# Patient Record
Sex: MALE | Race: WHITE | NOT HISPANIC OR LATINO | Employment: FULL TIME | URBAN - NONMETROPOLITAN AREA
[De-identification: names, ages, dates, MRNs, and addresses within clinical notes are randomized per-mention and may not be internally consistent; named-entity substitution may affect disease eponyms.]

---

## 2023-11-29 ENCOUNTER — APPOINTMENT (OUTPATIENT)
Dept: CHIROPRACTIC MEDICINE | Facility: OTHER | Age: 33
End: 2023-11-29

## 2023-11-29 ENCOUNTER — APPOINTMENT (OUTPATIENT)
Dept: OCCUPATIONAL MEDICINE | Facility: OTHER | Age: 33
End: 2023-11-29

## 2023-11-29 PROCEDURE — 99499 UNLISTED E&M SERVICE: CPT

## 2023-11-29 PROCEDURE — 99000 SPECIMEN HANDLING OFFICE-LAB: CPT

## 2024-08-05 NOTE — PROGRESS NOTES
"  Assessment & Plan     (K21.9) Gastroesophageal reflux disease without esophagitis  (primary encounter diagnosis)  Comment: Stable. RF   Plan: omeprazole (PRILOSEC) 20 MG DR capsule            (G47.00) Persistent insomnia  Comment: RF. He takes Trazodone 150 mg at bedtime   Plan: traZODone (DESYREL) 50 MG tablet            (F41.1) BENITO (generalized anxiety disorder)  Comment: RF Lexapro. Add Wellbutrin   Plan: buPROPion (WELLBUTRIN XL) 150 MG 24 hr tablet,         escitalopram (LEXAPRO) 20 MG tablet            (F34.1) Dysthymia  Comment: RF Lexapro. Add Wellbutrin   Plan: buPROPion (WELLBUTRIN XL) 150 MG 24 hr tablet,         escitalopram (LEXAPRO) 20 MG tablet            (Z76.89) Encounter to establish care  Comment: Reviewed past medical, surgical, and family history. Medications reviewed. Care established   Plan: As above     The longitudinal plan of care for the diagnosis(es)/condition(s) as documented were addressed during this visit. Due to the added complexity in care, I will continue to support Chaitanya in the subsequent management and with ongoing continuity of care.    BMI  Estimated body mass index is 35.12 kg/m  as calculated from the following:    Height as of this encounter: 1.93 m (6' 4\").    Weight as of this encounter: 130.9 kg (288 lb 8 oz).   Weight management plan: Discussed healthy diet and exercise guidelines      See Patient Instructions    Return in about 6 weeks (around 9/24/2024) for Med check follow up and fasting physical .    Kameron Tavares is a 33 year old, presenting for the following health issues:  Establish Care        8/13/2024     8:34 AM   Additional Questions   Roomed by Lanie KELLY     History of Present Illness       Reason for visit:  Recent move    He eats 0-1 servings of fruits and vegetables daily.He consumes 1 sweetened beverage(s) daily.He exercises with enough effort to increase his heart rate 10 to 19 minutes per day.  He exercises with enough effort to increase his " heart rate 5 days per week.   He is taking medications regularly.       New Patient/Transfer of Care     Chaitanya moved to [place] from [place] in 2023. Last PCP at [facility] in [place]. He signed EDUARDA for records.     He works overnights as a . He has increased stress with working overnights. He is trying to find a job that doesn't require overnight work.     Health Care Directive  Patient does not have a Health Care Directive or Living Will: Discussed advance care planning with patient; however, patient declined at this time.    Medication Followup of Lexapro  Taking Medication as prescribed: yes  Side Effects:  sex drive is way down since starting it  Medication Helping Symptoms:  it does help, but doesn't fee like it is as effective as a year ago    Depression and Anxiety   How are you doing with your depression since your last visit? Worsened   How are you doing with your anxiety since your last visit?  No change  Are you having other symptoms that might be associated with depression or anxiety? No  Have you had a significant life event? OTHER: moved and switched jobs    Do you have any concerns with your use of alcohol or other drugs? No  Denies SI/HI thoughts.     Social History     Tobacco Use    Smoking status: Former     Current packs/day: 0.00     Average packs/day: 1 pack/day for 10.0 years (10.0 ttl pk-yrs)     Types: Cigarettes     Start date: 10/28/2009     Quit date: 10/28/2019     Years since quittin.7    Smokeless tobacco: Never   Vaping Use    Vaping status: Never Used   Substance Use Topics    Alcohol use: Yes     Comment: Social drinker. A few a week    Drug use: Never         2024     8:44 AM   PHQ   PHQ-9 Total Score 4   Q9: Thoughts of better off dead/self-harm past 2 weeks Not at all         2024     8:44 AM   BENITO-7 SCORE   Total Score 5     Denies SI/HI thoughts.     Review of Systems  Constitutional, HEENT, cardiovascular, pulmonary, GI, , musculoskeletal,  "neuro, skin, endocrine and psych systems are negative, except as otherwise noted.      Objective    /80 (BP Location: Left arm, Patient Position: Sitting, Cuff Size: Adult Large)   Pulse 70   Temp 97.1  F (36.2  C) (Tympanic)   Resp 20   Ht 1.93 m (6' 4\")   Wt 130.9 kg (288 lb 8 oz)   SpO2 96%   BMI 35.12 kg/m    Body mass index is 35.12 kg/m .  Physical Exam   GENERAL: alert and no distress  NECK: no adenopathy, no asymmetry, masses, or scars  RESP: lungs clear to auscultation - no rales, rhonchi or wheezes  CV: regular rate and rhythm, normal S1 S2, no S3 or S4, no murmur, click or rub, no peripheral edema  MS: no gross musculoskeletal defects noted, no edema  SKIN: no suspicious lesions or rashes  NEURO: Normal strength and tone, mentation intact and speech normal  PSYCH: mentation appears normal, affect normal/bright        Signed Electronically by: CAROL Burdick CNP    "

## 2024-08-13 ENCOUNTER — OFFICE VISIT (OUTPATIENT)
Dept: FAMILY MEDICINE | Facility: OTHER | Age: 34
End: 2024-08-13
Attending: NURSE PRACTITIONER
Payer: COMMERCIAL

## 2024-08-13 VITALS
RESPIRATION RATE: 20 BRPM | DIASTOLIC BLOOD PRESSURE: 80 MMHG | SYSTOLIC BLOOD PRESSURE: 124 MMHG | HEIGHT: 76 IN | TEMPERATURE: 97.1 F | WEIGHT: 288.5 LBS | OXYGEN SATURATION: 96 % | HEART RATE: 70 BPM | BODY MASS INDEX: 35.13 KG/M2

## 2024-08-13 DIAGNOSIS — Z76.89 ENCOUNTER TO ESTABLISH CARE: ICD-10-CM

## 2024-08-13 DIAGNOSIS — F34.1 DYSTHYMIA: ICD-10-CM

## 2024-08-13 DIAGNOSIS — G47.00 PERSISTENT INSOMNIA: ICD-10-CM

## 2024-08-13 DIAGNOSIS — K21.9 GASTROESOPHAGEAL REFLUX DISEASE WITHOUT ESOPHAGITIS: Primary | ICD-10-CM

## 2024-08-13 DIAGNOSIS — F41.1 GAD (GENERALIZED ANXIETY DISORDER): ICD-10-CM

## 2024-08-13 PROCEDURE — 99204 OFFICE O/P NEW MOD 45 MIN: CPT | Performed by: NURSE PRACTITIONER

## 2024-08-13 PROCEDURE — G2211 COMPLEX E/M VISIT ADD ON: HCPCS | Performed by: NURSE PRACTITIONER

## 2024-08-13 RX ORDER — OMEPRAZOLE 20 MG/1
20 TABLET, DELAYED RELEASE ORAL DAILY
COMMUNITY
End: 2024-08-13

## 2024-08-13 RX ORDER — ESCITALOPRAM OXALATE 20 MG/1
20 TABLET ORAL DAILY
Qty: 90 TABLET | Refills: 3 | Status: SHIPPED | OUTPATIENT
Start: 2024-08-13 | End: 2024-11-11

## 2024-08-13 RX ORDER — BUPROPION HYDROCHLORIDE 150 MG/1
150 TABLET ORAL EVERY MORNING
Qty: 90 TABLET | Refills: 0 | Status: SHIPPED | OUTPATIENT
Start: 2024-08-13

## 2024-08-13 RX ORDER — TRAZODONE HYDROCHLORIDE 50 MG/1
150 TABLET, FILM COATED ORAL AT BEDTIME
Qty: 270 TABLET | Refills: 3 | Status: SHIPPED | OUTPATIENT
Start: 2024-08-13 | End: 2024-11-11

## 2024-08-13 ASSESSMENT — ANXIETY QUESTIONNAIRES
GAD7 TOTAL SCORE: 5
GAD7 TOTAL SCORE: 5
5. BEING SO RESTLESS THAT IT IS HARD TO SIT STILL: NOT AT ALL
2. NOT BEING ABLE TO STOP OR CONTROL WORRYING: SEVERAL DAYS
3. WORRYING TOO MUCH ABOUT DIFFERENT THINGS: SEVERAL DAYS
7. FEELING AFRAID AS IF SOMETHING AWFUL MIGHT HAPPEN: NOT AT ALL
IF YOU CHECKED OFF ANY PROBLEMS ON THIS QUESTIONNAIRE, HOW DIFFICULT HAVE THESE PROBLEMS MADE IT FOR YOU TO DO YOUR WORK, TAKE CARE OF THINGS AT HOME, OR GET ALONG WITH OTHER PEOPLE: SOMEWHAT DIFFICULT
1. FEELING NERVOUS, ANXIOUS, OR ON EDGE: SEVERAL DAYS
6. BECOMING EASILY ANNOYED OR IRRITABLE: SEVERAL DAYS

## 2024-08-13 ASSESSMENT — PATIENT HEALTH QUESTIONNAIRE - PHQ9
SUM OF ALL RESPONSES TO PHQ QUESTIONS 1-9: 4
5. POOR APPETITE OR OVEREATING: SEVERAL DAYS

## 2024-08-13 ASSESSMENT — PAIN SCALES - GENERAL: PAINLEVEL: NO PAIN (0)

## 2024-10-06 ENCOUNTER — HEALTH MAINTENANCE LETTER (OUTPATIENT)
Age: 34
End: 2024-10-06

## 2024-10-20 DIAGNOSIS — F34.1 DYSTHYMIA: ICD-10-CM

## 2024-10-20 DIAGNOSIS — F41.1 GAD (GENERALIZED ANXIETY DISORDER): ICD-10-CM

## 2024-10-21 RX ORDER — BUPROPION HYDROCHLORIDE 150 MG/1
150 TABLET ORAL EVERY MORNING
Qty: 90 TABLET | Refills: 0 | Status: SHIPPED | OUTPATIENT
Start: 2024-10-21

## 2025-02-03 DIAGNOSIS — F41.1 GAD (GENERALIZED ANXIETY DISORDER): ICD-10-CM

## 2025-02-03 DIAGNOSIS — F34.1 DYSTHYMIA: ICD-10-CM

## 2025-02-03 RX ORDER — BUPROPION HYDROCHLORIDE 150 MG/1
150 TABLET ORAL EVERY MORNING
Qty: 90 TABLET | Refills: 1 | Status: SHIPPED | OUTPATIENT
Start: 2025-02-03

## 2025-07-18 DIAGNOSIS — F41.1 GAD (GENERALIZED ANXIETY DISORDER): ICD-10-CM

## 2025-07-18 DIAGNOSIS — F34.1 DYSTHYMIA: ICD-10-CM

## 2025-07-18 DIAGNOSIS — K21.9 GASTROESOPHAGEAL REFLUX DISEASE WITHOUT ESOPHAGITIS: ICD-10-CM

## 2025-07-18 NOTE — TELEPHONE ENCOUNTER
escitalopram (LEXAPRO) 20 MG tablet       Last Written Prescription Date:  8/13/24  Last Fill Quantity: 90,   # refills: 3  Last Office Visit: 8/13/24  Future Office visit:       Routing refill request to provider for review/approval because:  SSRIs Protocol Failed      PHQ-9 score less than 5 in past 6 months    Please review last PHQ-9 score.        8/13/2024     8:44 AM   PHQ-9 SCORE   PHQ-9 Total Score 4        BENITO-7 score of less than 5 in past 6 months.    Please review last BENITO-7 score.        8/13/2024     8:44 AM   BENITO-7 SCORE   Total Score 5

## 2025-07-18 NOTE — TELEPHONE ENCOUNTER
Omeprazole 20mg Delayed-Release Capsule         Last Written Prescription Date:    Last Fill Quantity: ,   # refills:   Last Office Visit: 8/13/24  Future Office visit:       Routing refill request to provider for review/approval because:  PPI Protocol [id number]/2025 12:17 PM   Protocol Details Medication is active on med list and the sig matches. RN to manually verify dose and sig if red X/fail.

## 2025-07-21 RX ORDER — OMEPRAZOLE 20 MG/1
20 CAPSULE, DELAYED RELEASE ORAL DAILY
Qty: 90 CAPSULE | Refills: 0 | Status: SHIPPED | OUTPATIENT
Start: 2025-07-21

## 2025-07-21 RX ORDER — ESCITALOPRAM OXALATE 20 MG/1
20 TABLET ORAL DAILY
Qty: 90 TABLET | Refills: 2 | Status: SHIPPED | OUTPATIENT
Start: 2025-07-21

## 2025-07-24 DIAGNOSIS — G47.00 PERSISTENT INSOMNIA: ICD-10-CM

## 2025-07-24 RX ORDER — TRAZODONE HYDROCHLORIDE 50 MG/1
150 TABLET ORAL AT BEDTIME
Qty: 270 TABLET | Refills: 2 | Status: SHIPPED | OUTPATIENT
Start: 2025-07-24